# Patient Record
(demographics unavailable — no encounter records)

---

## 2024-10-28 NOTE — PHYSICAL EXAM
[Rad] : radial 2+ and symmetric bilaterally [Normal] : Alert and in no acute distress [Poor Appearance] : well-appearing [Acute Distress] : not in acute distress [Obese] : not obese [de-identified] : The patient has no respiratory distress. Mood and affect are normal. The patient is alert and oriented to person, place and time. Examination of the cervical spine demonstrates no tenderness, no deformity and no muscle spasm. Cervical spine rotation is 60 to the right, 60 to the left, 75 of extension and 45 of flexion. Neurologic exam of the upper extremities reveals intact sensation to light touch. Motor function is 5 over 5 in all groups. Deep tendon reflexes are 2+ and equal at the biceps, triceps and brachioradialis. Examination of the shoulders demonstrates no swelling or deformity.  There is no tenderness of the right shoulder.  There is no instability.  Drop arm test is negative.  Impingement is negative.  Crisp test is negative.  He has elevation of 145 degrees bilaterally.  He has internal rotation on the right to the lower lumbar level and on the left to the upper lumbar level.  He has 50 degrees of extension at both shoulders.  Elbows are stable.  The skin is intact.  There is no lymphedema.

## 2024-10-28 NOTE — HISTORY OF PRESENT ILLNESS
[de-identified] : Patient presents for evaluation of right shoulder pain. He has had some improvement with PT. He still has some pain with sleeping on the right side or keeping the arm lifted for a prolonged period of time. He has been icing and taking Advil with good relief. He would like to continue with PT.

## 2024-10-28 NOTE — DISCUSSION/SUMMARY
[de-identified] : The patient has had some improvement in range of motion of his right shoulder.  Treatment options were discussed.  He will continue physical therapy.  He will be reevaluated in 1 month.

## 2024-12-02 NOTE — DISCUSSION/SUMMARY
[de-identified] : The patient is improving with regard to his right shoulder pain.  He no longer has any pain and has just a small amount of stiffness.  He will continue his physical therapy program.  In terms of his knees he has arthritis of both knees.  I have discussed the pathology, natural history and treatment options with him.  He is started on home quadriceps strengthening and hamstring stretching exercises.  He will be reevaluated in 6 weeks.  If the home program for the knee is not satisfactory he will be sent for formal physical therapy for his knees.

## 2024-12-02 NOTE — PHYSICAL EXAM
[LE] : Sensory: Intact in bilateral lower extremities [PT] : posterior tibial 2+ and symmetric bilaterally [Rad] : radial 2+ and symmetric bilaterally [Normal] : Alert and in no acute distress [Poor Appearance] : well-appearing [Acute Distress] : not in acute distress [Obese] : not obese [de-identified] : The patient has no respiratory distress. Mood and affect are normal. The patient is alert and oriented to person, place and time. Examination of the cervical spine demonstrates no tenderness, no deformity and no muscle spasm. Cervical spine rotation is 60 to the right, 60 to the left, 75 of extension and 45 of flexion. Neurologic exam of the upper extremities reveals intact sensation to light touch. Motor function is 5 over 5 in all groups. Deep tendon reflexes are 2+ and equal at the biceps, triceps and brachioradialis. Examination of the shoulders demonstrates no swelling or deformity.  There is no tenderness of the right shoulder.  There is no instability.  Drop arm test is negative.  Impingement is negative.  [place] test is negative.  He has elevation of 145 degrees bilaterally.  He has internal rotation on the right to the mid lumbar level and on the left to the upper lumbar level.  He has 50 degrees of extension at both shoulders.  Elbows are stable.  The skin is intact.  There is no lymphedema. There is no pain with active or passive motion of the hips.  There is no tenderness of either hip.  There is no tenderness of either joint line.  Quadriceps and hamstring function are intact.  There is no instability of the collateral or cruciate ligaments.  Range of motion 0 to 120 degrees bilaterally.  He has no pain when squatting.  Robyn test is negative.  The calves are soft and nontender.  The skin is intact.  There is no lymphedema. [de-identified] : AP, lateral, tunnel and sunrise x-rays of both knees taken today demonstrate degenerative changes.  There are no fractures or dislocations.

## 2024-12-02 NOTE — HISTORY OF PRESENT ILLNESS
[de-identified] : Patient presents for evaluation of right shoulder pain. He has had some improvement with PT. He still has some pain with end motion. He has not felt the need to take any medication for the pain. He is interested in continuing PT.  He also complains of bilateral knee pain x 1 year. He reports intermittent pain and swelling over the anterior knees worse when active, particularly while playing basketball and climbing stairs. Pain improves with rest. Denies prior treatment.

## 2025-05-03 NOTE — HEALTH RISK ASSESSMENT
[Very Good] : ~his/her~  mood as very good [No] : In the past 12 months have you used drugs other than those required for medical reasons? No [Little interest or pleasure doing things] : 1) Little interest or pleasure doing things [Feeling down, depressed, or hopeless] : 2) Feeling down, depressed, or hopeless [0] : 2) Feeling down, depressed, or hopeless: Not at all (0) [PHQ-2 Negative - No further assessment needed] : PHQ-2 Negative - No further assessment needed [Never] : Never [None] : None [With Family] : lives with family [# of Members in Household ___] :  household currently consist of [unfilled] member(s) [Employed] : employed [Graduate School] : graduate school [] :  [# Of Children ___] : has [unfilled] children [Feels Safe at Home] : Feels safe at home [Fully functional (bathing, dressing, toileting, transferring, walking, feeding)] : Fully functional (bathing, dressing, toileting, transferring, walking, feeding) [Fully functional (using the telephone, shopping, preparing meals, housekeeping, doing laundry, using] : Fully functional and needs no help or supervision to perform IADLs (using the telephone, shopping, preparing meals, housekeeping, doing laundry, using transportation, managing medications and managing finances) [Smoke Detector] : smoke detector [Carbon Monoxide Detector] : carbon monoxide detector [Seat Belt] :  uses seat belt [One fall no injury in past year] : Patient reported one fall in the past year without injury [NO] : No [de-identified] : work out 2-3 x sometimes, 30-4x each time, then plays basketball 1-2 x a week, [Audit-CScore] : 0 [[id number]] : 0 [EyeExamDate] : 03/24 [Change in mental status noted] : No change in mental status noted [Reports changes in hearing] : Reports no changes in hearing [Reports changes in vision] : Reports no changes in vision [Reports changes in dental health] : Reports no changes in dental health [de-identified] : dentist - 4/2025, 2x per year

## 2025-05-03 NOTE — HISTORY OF PRESENT ILLNESS
[FreeTextEntry1] : 43 y/o man presented for PE.  Last PE was in 1 year ago. [de-identified] : His health was uneventful since last visit, he has no new complaint.   Pt saw ortho over the past year for R shoulder pain and b/l knee pain.  He had PT and R should is better, but knee still hurts after basketball, but wears brace and has learned to cope.  Pt had COVID twice in 1/2021 & 9/2022 & 1/2023, they were mild and recovered completely.  He had 4 doses of COVID vaccine.   He also had Flu vaccine at the pharmacy.  Allergy symptoms started and taking Zyrtec with adequate control.

## 2025-05-03 NOTE — HISTORY OF PRESENT ILLNESS
[FreeTextEntry1] : 41 y/o man presented for PE.  Last PE was in 1 year ago. [de-identified] : His health was uneventful since last visit, he has no new complaint.   Pt saw ortho over the past year for R shoulder pain and b/l knee pain.  He had PT and R should is better, but knee still hurts after basketball, but wears brace and has learned to cope.  Pt had COVID twice in 1/2021 & 9/2022 & 1/2023, they were mild and recovered completely.  He had 4 doses of COVID vaccine.   He also had Flu vaccine at the pharmacy.  Allergy symptoms started and taking Zyrtec with adequate control.

## 2025-05-03 NOTE — HEALTH RISK ASSESSMENT
[Very Good] : ~his/her~  mood as very good [No] : In the past 12 months have you used drugs other than those required for medical reasons? No [Little interest or pleasure doing things] : 1) Little interest or pleasure doing things [Feeling down, depressed, or hopeless] : 2) Feeling down, depressed, or hopeless [0] : 2) Feeling down, depressed, or hopeless: Not at all (0) [PHQ-2 Negative - No further assessment needed] : PHQ-2 Negative - No further assessment needed [Never] : Never [None] : None [With Family] : lives with family [# of Members in Household ___] :  household currently consist of [unfilled] member(s) [Employed] : employed [Graduate School] : graduate school [] :  [# Of Children ___] : has [unfilled] children [Feels Safe at Home] : Feels safe at home [Fully functional (bathing, dressing, toileting, transferring, walking, feeding)] : Fully functional (bathing, dressing, toileting, transferring, walking, feeding) [Fully functional (using the telephone, shopping, preparing meals, housekeeping, doing laundry, using] : Fully functional and needs no help or supervision to perform IADLs (using the telephone, shopping, preparing meals, housekeeping, doing laundry, using transportation, managing medications and managing finances) [Smoke Detector] : smoke detector [Carbon Monoxide Detector] : carbon monoxide detector [Seat Belt] :  uses seat belt [One fall no injury in past year] : Patient reported one fall in the past year without injury [NO] : No [Audit-CScore] : 0 [de-identified] : work out 2-3 x sometimes, 30-4x each time, then plays basketball 1-2 x a week, [[id number]] : 0 [EyeExamDate] : 03/24 [Change in mental status noted] : No change in mental status noted [Reports changes in hearing] : Reports no changes in hearing [Reports changes in vision] : Reports no changes in vision [Reports changes in dental health] : Reports no changes in dental health [de-identified] : dentist - 4/2025, 2x per year

## 2025-05-03 NOTE — ASSESSMENT
[FreeTextEntry1] : Patient was reminded to have routine eye exam and dental care. [Vaccines Reviewed] : Immunizations reviewed today. Please see immunization details in the vaccine log within the immunization flowsheet.

## 2025-05-03 NOTE — REVIEW OF SYSTEMS
[Joint Pain] : joint pain [Negative] : Heme/Lymph [Fever] : no fever [Chills] : no chills [Fatigue] : no fatigue [Recent Change In Weight] : ~T no recent weight change [Chest Pain] : no chest pain [Palpitations] : no palpitations [Lower Ext Edema] : no lower extremity edema [Shortness Of Breath] : no shortness of breath [Wheezing] : no wheezing [Cough] : no cough [Dyspnea on Exertion] : not dyspnea on exertion [Abdominal Pain] : no abdominal pain [Nausea] : no nausea [Constipation] : no constipation [Diarrhea] : no diarrhea [Vomiting] : no vomiting [Heartburn] : no heartburn [Melena] : no melena [Dysuria] : no dysuria [Incontinence] : no incontinence [Nocturia] : no nocturia [Hematuria] : no hematuria [Joint Stiffness] : no joint stiffness [Muscle Pain] : no muscle pain [Back Pain] : no back pain [Joint Swelling] : no joint swelling [Itching] : no itching [Mole Changes] : no mole changes [Skin Rash] : no skin rash [Headache] : no headache [Dizziness] : no dizziness [Fainting] : no fainting [Unsteady Walk] : no ataxia [Insomnia] : no insomnia [Anxiety] : no anxiety [Depression] : no depression [Easy Bleeding] : no easy bleeding [Easy Bruising] : no easy bruising [Swollen Glands] : no swollen glands [FreeTextEntry3] : wears corrective lens [FreeTextEntry4] : Allergy symptoms controlled with meds, [FreeTextEntry9] : Occ knee pain after playing basketball, R > L, wearing braces when he plays basketball,

## 2025-05-03 NOTE — PHYSICAL EXAM
[No Acute Distress] : no acute distress [Well Nourished] : well nourished [Well Developed] : well developed [Normal Sclera/Conjunctiva] : normal sclera/conjunctiva [PERRL] : pupils equal round and reactive to light [EOMI] : extraocular movements intact [Normal Outer Ear/Nose] : the outer ears and nose were normal in appearance [Normal Oropharynx] : the oropharynx was normal [Normal TMs] : both tympanic membranes were normal [Normal Nasal Mucosa] : the nasal mucosa was normal [No JVD] : no jugular venous distention [No Lymphadenopathy] : no lymphadenopathy [Supple] : supple [No Respiratory Distress] : no respiratory distress  [Clear to Auscultation] : lungs were clear to auscultation bilaterally [Normal Rate] : normal rate  [Regular Rhythm] : with a regular rhythm [Normal S1, S2] : normal S1 and S2 [No Carotid Bruits] : no carotid bruits [Pedal Pulses Present] : the pedal pulses are present [No Edema] : there was no peripheral edema [No Extremity Clubbing/Cyanosis] : no extremity clubbing/cyanosis [Normal Appearance] : normal in appearance [No Masses] : no palpable masses [No Nipple Discharge] : no nipple discharge [No Axillary Lymphadenopathy] : no axillary lymphadenopathy [Soft] : abdomen soft [Non Tender] : non-tender [Non-distended] : non-distended [Normal Bowel Sounds] : normal bowel sounds [Normal Sphincter Tone] : normal sphincter tone [No Mass] : no mass [Prostate Enlargement] : the prostate was not enlarged [Prostate Tenderness] : the prostate was not tender [No Prostate Nodules] : no prostate nodules [Normal Supraclavicular Nodes] : no supraclavicular lymphadenopathy [Normal Axillary Nodes] : no axillary lymphadenopathy [Normal Posterior Cervical Nodes] : no posterior cervical lymphadenopathy [Normal Anterior Cervical Nodes] : no anterior cervical lymphadenopathy [No CVA Tenderness] : no CVA  tenderness [No Spinal Tenderness] : no spinal tenderness [No Joint Swelling] : no joint swelling [Grossly Normal Strength/Tone] : grossly normal strength/tone [No Rash] : no rash [Coordination Grossly Intact] : coordination grossly intact [No Focal Deficits] : no focal deficits [Normal Gait] : normal gait [Speech Grossly Normal] : speech grossly normal [Normal Affect] : the affect was normal [Alert and Oriented x3] : oriented to person, place, and time [Normal Mood] : the mood was normal [Stool Occult Blood] : stool negative for occult blood [de-identified] : male in stated age,  [de-identified] : Good ROM of knees,

## 2025-05-03 NOTE — PHYSICAL EXAM
[No Acute Distress] : no acute distress [Well Nourished] : well nourished [Well Developed] : well developed [Normal Sclera/Conjunctiva] : normal sclera/conjunctiva [PERRL] : pupils equal round and reactive to light [EOMI] : extraocular movements intact [Normal Outer Ear/Nose] : the outer ears and nose were normal in appearance [Normal Oropharynx] : the oropharynx was normal [Normal TMs] : both tympanic membranes were normal [Normal Nasal Mucosa] : the nasal mucosa was normal [No JVD] : no jugular venous distention [No Lymphadenopathy] : no lymphadenopathy [Supple] : supple [No Respiratory Distress] : no respiratory distress  [Clear to Auscultation] : lungs were clear to auscultation bilaterally [Normal Rate] : normal rate  [Regular Rhythm] : with a regular rhythm [Normal S1, S2] : normal S1 and S2 [No Carotid Bruits] : no carotid bruits [Pedal Pulses Present] : the pedal pulses are present [No Edema] : there was no peripheral edema [No Extremity Clubbing/Cyanosis] : no extremity clubbing/cyanosis [Normal Appearance] : normal in appearance [No Masses] : no palpable masses [No Nipple Discharge] : no nipple discharge [No Axillary Lymphadenopathy] : no axillary lymphadenopathy [Soft] : abdomen soft [Non Tender] : non-tender [Non-distended] : non-distended [Normal Bowel Sounds] : normal bowel sounds [Normal Sphincter Tone] : normal sphincter tone [No Mass] : no mass [Prostate Enlargement] : the prostate was not enlarged [Prostate Tenderness] : the prostate was not tender [No Prostate Nodules] : no prostate nodules [Normal Supraclavicular Nodes] : no supraclavicular lymphadenopathy [Normal Axillary Nodes] : no axillary lymphadenopathy [Normal Posterior Cervical Nodes] : no posterior cervical lymphadenopathy [Normal Anterior Cervical Nodes] : no anterior cervical lymphadenopathy [No CVA Tenderness] : no CVA  tenderness [No Spinal Tenderness] : no spinal tenderness [No Joint Swelling] : no joint swelling [Grossly Normal Strength/Tone] : grossly normal strength/tone [No Rash] : no rash [Coordination Grossly Intact] : coordination grossly intact [No Focal Deficits] : no focal deficits [Normal Gait] : normal gait [Speech Grossly Normal] : speech grossly normal [Normal Affect] : the affect was normal [Alert and Oriented x3] : oriented to person, place, and time [Normal Mood] : the mood was normal [Stool Occult Blood] : stool negative for occult blood [de-identified] : male in stated age,  [de-identified] : Good ROM of knees,